# Patient Record
Sex: FEMALE | Race: WHITE | NOT HISPANIC OR LATINO
[De-identification: names, ages, dates, MRNs, and addresses within clinical notes are randomized per-mention and may not be internally consistent; named-entity substitution may affect disease eponyms.]

---

## 2020-08-04 PROBLEM — Z00.00 ENCOUNTER FOR PREVENTIVE HEALTH EXAMINATION: Status: ACTIVE | Noted: 2020-08-04

## 2020-08-05 ENCOUNTER — APPOINTMENT (OUTPATIENT)
Dept: OBGYN | Facility: CLINIC | Age: 35
End: 2020-08-05

## 2020-08-11 ENCOUNTER — APPOINTMENT (OUTPATIENT)
Dept: OBGYN | Facility: CLINIC | Age: 35
End: 2020-08-11
Payer: COMMERCIAL

## 2020-08-11 DIAGNOSIS — Z80.8 FAMILY HISTORY OF MALIGNANT NEOPLASM OF OTHER ORGANS OR SYSTEMS: ICD-10-CM

## 2020-08-11 DIAGNOSIS — H52.10 UNSPECIFIED ASTIGMATISM, UNSPECIFIED EYE: ICD-10-CM

## 2020-08-11 DIAGNOSIS — N91.2 AMENORRHEA, UNSPECIFIED: ICD-10-CM

## 2020-08-11 DIAGNOSIS — H52.209 UNSPECIFIED ASTIGMATISM, UNSPECIFIED EYE: ICD-10-CM

## 2020-08-11 DIAGNOSIS — Z86.59 PERSONAL HISTORY OF OTHER MENTAL AND BEHAVIORAL DISORDERS: ICD-10-CM

## 2020-08-11 DIAGNOSIS — Z80.49 FAMILY HISTORY OF MALIGNANT NEOPLASM OF OTHER GENITAL ORGANS: ICD-10-CM

## 2020-08-11 DIAGNOSIS — Z87.81 PERSONAL HISTORY OF (HEALED) TRAUMATIC FRACTURE: ICD-10-CM

## 2020-08-11 PROCEDURE — 99214 OFFICE O/P EST MOD 30 MIN: CPT | Mod: 95

## 2020-08-11 NOTE — HISTORY OF PRESENT ILLNESS
[unknown] : the patient is unsure of the date of her LMP [Regular Cycle Intervals] : periods have been irregular [On BCP at conception] : the patient was not on BCP at conception

## 2020-08-20 ENCOUNTER — APPOINTMENT (OUTPATIENT)
Dept: OBGYN | Facility: CLINIC | Age: 35
End: 2020-08-20
Payer: COMMERCIAL

## 2020-08-20 VITALS
DIASTOLIC BLOOD PRESSURE: 68 MMHG | SYSTOLIC BLOOD PRESSURE: 110 MMHG | BODY MASS INDEX: 27.56 KG/M2 | HEIGHT: 61 IN | WEIGHT: 146 LBS

## 2020-08-20 DIAGNOSIS — E55.9 VITAMIN D DEFICIENCY, UNSPECIFIED: ICD-10-CM

## 2020-08-20 DIAGNOSIS — Z11.59 ENCOUNTER FOR SCREENING FOR OTHER VIRAL DISEASES: ICD-10-CM

## 2020-08-20 DIAGNOSIS — Z32.00 ENCOUNTER FOR PREGNANCY TEST, RESULT UNKNOWN: ICD-10-CM

## 2020-08-20 PROCEDURE — 99214 OFFICE O/P EST MOD 30 MIN: CPT

## 2020-08-20 PROCEDURE — 76830 TRANSVAGINAL US NON-OB: CPT

## 2020-08-20 PROCEDURE — 36415 COLL VENOUS BLD VENIPUNCTURE: CPT

## 2020-08-21 ENCOUNTER — TRANSCRIPTION ENCOUNTER (OUTPATIENT)
Age: 35
End: 2020-08-21

## 2020-08-21 LAB
25(OH)D3 SERPL-MCNC: 34.5 NG/ML
ABO + RH PNL BLD: NORMAL
ALBUMIN SERPL ELPH-MCNC: 4.4 G/DL
ALP BLD-CCNC: 52 U/L
ALT SERPL-CCNC: 18 U/L
ANION GAP SERPL CALC-SCNC: 16 MMOL/L
AST SERPL-CCNC: 16 U/L
BASOPHILS # BLD AUTO: 0.06 K/UL
BASOPHILS NFR BLD AUTO: 0.5 %
BILIRUB SERPL-MCNC: 0.2 MG/DL
BLD GP AB SCN SERPL QL: NORMAL
BUN SERPL-MCNC: 13 MG/DL
CALCIUM SERPL-MCNC: 9.4 MG/DL
CHLORIDE SERPL-SCNC: 101 MMOL/L
CO2 SERPL-SCNC: 20 MMOL/L
CREAT SERPL-MCNC: 0.46 MG/DL
EOSINOPHIL # BLD AUTO: 0.12 K/UL
EOSINOPHIL NFR BLD AUTO: 0.9 %
FERRITIN SERPL-MCNC: 38 NG/ML
GLUCOSE SERPL-MCNC: 76 MG/DL
HBV SURFACE AG SER QL: NONREACTIVE
HCT VFR BLD CALC: 42.8 %
HGB BLD-MCNC: 13.9 G/DL
HIV1+2 AB SPEC QL IA.RAPID: NONREACTIVE
IMM GRANULOCYTES NFR BLD AUTO: 0.3 %
LYMPHOCYTES # BLD AUTO: 2.62 K/UL
LYMPHOCYTES NFR BLD AUTO: 20.1 %
MAN DIFF?: NORMAL
MCHC RBC-ENTMCNC: 32.1 PG
MCHC RBC-ENTMCNC: 32.5 GM/DL
MCV RBC AUTO: 98.8 FL
MEV IGG FLD QL IA: 162 AU/ML
MEV IGG+IGM SER-IMP: POSITIVE
MONOCYTES # BLD AUTO: 0.74 K/UL
MONOCYTES NFR BLD AUTO: 5.7 %
MUV AB SER-ACNC: POSITIVE
MUV IGG SER QL IA: 33.3 AU/ML
NEUTROPHILS # BLD AUTO: 9.47 K/UL
NEUTROPHILS NFR BLD AUTO: 72.5 %
PLATELET # BLD AUTO: 422 K/UL
POTASSIUM SERPL-SCNC: 4.5 MMOL/L
PROT SERPL-MCNC: 6.7 G/DL
RBC # BLD: 4.33 M/UL
RBC # FLD: 12.9 %
RUBV IGG FLD-ACNC: 3.6 INDEX
RUBV IGG SER-IMP: POSITIVE
SARS-COV-2 IGG SERPL IA-ACNC: 0.09 INDEX
SARS-COV-2 IGG SERPL QL IA: NEGATIVE
SODIUM SERPL-SCNC: 137 MMOL/L
T PALLIDUM AB SER QL IA: NEGATIVE
VZV AB TITR SER: POSITIVE
VZV IGG SER IF-ACNC: 657.2 INDEX
WBC # FLD AUTO: 13.05 K/UL

## 2020-08-24 LAB
BACTERIA UR CULT: NORMAL
HGB A MFR BLD: 97.6 %
HGB A2 MFR BLD: 2.4 %
HGB FRACT BLD-IMP: NORMAL
LEAD BLD-MCNC: <1 UG/DL

## 2020-08-25 ENCOUNTER — TRANSCRIPTION ENCOUNTER (OUTPATIENT)
Age: 35
End: 2020-08-25

## 2020-08-26 LAB
B19V IGG SER QL IA: 4.4 INDEX
B19V IGG+IGM SER-IMP: NORMAL
B19V IGG+IGM SER-IMP: POSITIVE
B19V IGM FLD-ACNC: 0.2 INDEX
B19V IGM SER-ACNC: NEGATIVE

## 2020-08-31 ENCOUNTER — APPOINTMENT (OUTPATIENT)
Dept: OBGYN | Facility: CLINIC | Age: 35
End: 2020-08-31
Payer: COMMERCIAL

## 2020-08-31 ENCOUNTER — NON-APPOINTMENT (OUTPATIENT)
Age: 35
End: 2020-08-31

## 2020-08-31 PROCEDURE — 0500F INITIAL PRENATAL CARE VISIT: CPT

## 2020-08-31 RX ORDER — DIPHENHYDRAMINE HCL 50 MG
25 CAPSULE ORAL
Qty: 30 | Refills: 2 | Status: DISCONTINUED | COMMUNITY
Start: 2020-08-20 | End: 2020-08-31

## 2020-08-31 RX ORDER — DOXYCYCLINE 100 MG/1
100 TABLET, FILM COATED ORAL
Qty: 2 | Refills: 0 | Status: DISCONTINUED | COMMUNITY
Start: 2020-05-17

## 2020-09-10 ENCOUNTER — LABORATORY RESULT (OUTPATIENT)
Age: 35
End: 2020-09-10

## 2020-09-10 ENCOUNTER — APPOINTMENT (OUTPATIENT)
Dept: OBGYN | Facility: CLINIC | Age: 35
End: 2020-09-10
Payer: COMMERCIAL

## 2020-09-10 DIAGNOSIS — O21.9 VOMITING OF PREGNANCY, UNSPECIFIED: ICD-10-CM

## 2020-09-10 PROCEDURE — 99212 OFFICE O/P EST SF 10 MIN: CPT

## 2020-09-10 PROCEDURE — 36415 COLL VENOUS BLD VENIPUNCTURE: CPT

## 2020-09-13 LAB
H PYLORI AB SER-ACNC: <5 UNITS
H PYLORI IGA SER-ACNC: <5 UNITS
T3RU NFR SERPL: 1.3 TBI
T4 FREE SERPL-MCNC: 1.3 NG/DL
T4 SERPL-MCNC: 11.2 UG/DL
TSH SERPL-ACNC: 1.26 UIU/ML
VIT B12 SERPL-MCNC: 623 PG/ML

## 2020-09-15 ENCOUNTER — NON-APPOINTMENT (OUTPATIENT)
Age: 35
End: 2020-09-15

## 2020-09-21 ENCOUNTER — TRANSCRIPTION ENCOUNTER (OUTPATIENT)
Age: 35
End: 2020-09-21

## 2020-09-25 ENCOUNTER — TRANSCRIPTION ENCOUNTER (OUTPATIENT)
Age: 35
End: 2020-09-25

## 2020-10-03 ENCOUNTER — TRANSCRIPTION ENCOUNTER (OUTPATIENT)
Age: 35
End: 2020-10-03

## 2020-10-03 ENCOUNTER — RX RENEWAL (OUTPATIENT)
Age: 35
End: 2020-10-03

## 2020-10-08 ENCOUNTER — NON-APPOINTMENT (OUTPATIENT)
Age: 35
End: 2020-10-08

## 2020-10-08 ENCOUNTER — APPOINTMENT (OUTPATIENT)
Dept: OBGYN | Facility: CLINIC | Age: 35
End: 2020-10-08
Payer: COMMERCIAL

## 2020-10-08 VITALS
DIASTOLIC BLOOD PRESSURE: 62 MMHG | WEIGHT: 156 LBS | BODY MASS INDEX: 29.45 KG/M2 | HEIGHT: 61 IN | SYSTOLIC BLOOD PRESSURE: 120 MMHG

## 2020-10-08 DIAGNOSIS — Z23 ENCOUNTER FOR IMMUNIZATION: ICD-10-CM

## 2020-10-08 PROCEDURE — 81003 URINALYSIS AUTO W/O SCOPE: CPT | Mod: NC,QW

## 2020-10-08 PROCEDURE — G0008: CPT

## 2020-10-08 PROCEDURE — 0502F SUBSEQUENT PRENATAL CARE: CPT

## 2020-10-08 PROCEDURE — 36415 COLL VENOUS BLD VENIPUNCTURE: CPT

## 2020-10-08 PROCEDURE — 90686 IIV4 VACC NO PRSV 0.5 ML IM: CPT

## 2020-10-08 RX ORDER — ONDANSETRON 4 MG/1
4 TABLET, ORALLY DISINTEGRATING ORAL EVERY 8 HOURS
Qty: 42 | Refills: 2 | Status: DISCONTINUED | COMMUNITY
Start: 2020-08-31 | End: 2020-10-08

## 2020-10-09 LAB
BASOPHILS # BLD AUTO: 0.04 K/UL
BASOPHILS NFR BLD AUTO: 0.3 %
EOSINOPHIL # BLD AUTO: 0.11 K/UL
EOSINOPHIL NFR BLD AUTO: 0.9 %
HCT VFR BLD CALC: 41.5 %
HGB BLD-MCNC: 13.4 G/DL
IMM GRANULOCYTES NFR BLD AUTO: 0.4 %
LYMPHOCYTES # BLD AUTO: 2.19 K/UL
LYMPHOCYTES NFR BLD AUTO: 17.1 %
MAN DIFF?: NORMAL
MCHC RBC-ENTMCNC: 32.3 GM/DL
MCHC RBC-ENTMCNC: 32.4 PG
MCV RBC AUTO: 100.2 FL
MONOCYTES # BLD AUTO: 0.61 K/UL
MONOCYTES NFR BLD AUTO: 4.8 %
NEUTROPHILS # BLD AUTO: 9.8 K/UL
NEUTROPHILS NFR BLD AUTO: 76.5 %
PLATELET # BLD AUTO: 378 K/UL
RBC # BLD: 4.14 M/UL
RBC # FLD: 13.5 %
WBC # FLD AUTO: 12.8 K/UL

## 2020-10-13 LAB
2ND TRIMESTER DATA: NORMAL
AFP PNL SERPL: NORMAL
AFP SERPL-ACNC: NORMAL
C TRACH RRNA SPEC QL NAA+PROBE: NOT DETECTED
CLINICAL BIOCHEMIST REVIEW: NORMAL
N GONORRHOEA RRNA SPEC QL NAA+PROBE: NOT DETECTED
NOTES NTD: NORMAL
SOURCE AMPLIFICATION: NORMAL

## 2020-10-26 ENCOUNTER — RX RENEWAL (OUTPATIENT)
Age: 35
End: 2020-10-26

## 2020-10-26 ENCOUNTER — TRANSCRIPTION ENCOUNTER (OUTPATIENT)
Age: 35
End: 2020-10-26

## 2020-10-30 ENCOUNTER — APPOINTMENT (OUTPATIENT)
Dept: OBGYN | Facility: CLINIC | Age: 35
End: 2020-10-30
Payer: COMMERCIAL

## 2020-10-30 VITALS — WEIGHT: 164 LBS | HEIGHT: 61 IN | TEMPERATURE: 99.4 F | BODY MASS INDEX: 30.96 KG/M2

## 2020-10-30 DIAGNOSIS — Z71.9 COUNSELING, UNSPECIFIED: ICD-10-CM

## 2020-10-30 DIAGNOSIS — Z98.891 HISTORY OF UTERINE SCAR FROM PREVIOUS SURGERY: ICD-10-CM

## 2020-10-30 PROCEDURE — 99072 ADDL SUPL MATRL&STAF TM PHE: CPT

## 2020-10-30 PROCEDURE — 99213 OFFICE O/P EST LOW 20 MIN: CPT

## 2020-10-30 NOTE — PLAN
[FreeTextEntry1] : Pt counseled about r/b/a of  TOLAC d/w pt and she was given time to ask questions.  We discussed risk of uterine rupture around 1%, subsequent risk of fetal injury in the case of rupture. All questions answered.  She is cleared for vaginal delivery.

## 2020-10-30 NOTE — HISTORY OF PRESENT ILLNESS
[FreeTextEntry1] : 36yo P1 @ 20wks , seeing [facility] [place] here for  consult.\par Previous delivery at [place]: 2018- IOL for oligo at 41+ wks- got to FD and pushed for 3-4 hours but baby was asynclitic.  She was at a midwife practice in the city.\par \par Pt has provided her op report which shows LFT incision.2 layer closure\par \[place] Works in public health nursing.  Used to work at [employer] until this year.

## 2020-11-05 ENCOUNTER — APPOINTMENT (OUTPATIENT)
Dept: OBGYN | Facility: CLINIC | Age: 35
End: 2020-11-05
Payer: COMMERCIAL

## 2020-11-05 ENCOUNTER — NON-APPOINTMENT (OUTPATIENT)
Age: 35
End: 2020-11-05

## 2020-11-05 VITALS
DIASTOLIC BLOOD PRESSURE: 60 MMHG | BODY MASS INDEX: 31.34 KG/M2 | WEIGHT: 166 LBS | SYSTOLIC BLOOD PRESSURE: 116 MMHG | HEIGHT: 61 IN

## 2020-11-05 PROCEDURE — 0502F SUBSEQUENT PRENATAL CARE: CPT

## 2020-11-12 ENCOUNTER — TRANSCRIPTION ENCOUNTER (OUTPATIENT)
Age: 35
End: 2020-11-12

## 2020-12-01 ENCOUNTER — NON-APPOINTMENT (OUTPATIENT)
Age: 35
End: 2020-12-01

## 2020-12-01 ENCOUNTER — APPOINTMENT (OUTPATIENT)
Dept: OBGYN | Facility: CLINIC | Age: 35
End: 2020-12-01
Payer: COMMERCIAL

## 2020-12-01 PROCEDURE — 0502F SUBSEQUENT PRENATAL CARE: CPT

## 2020-12-18 ENCOUNTER — RX RENEWAL (OUTPATIENT)
Age: 35
End: 2020-12-18

## 2020-12-31 ENCOUNTER — APPOINTMENT (OUTPATIENT)
Dept: OBGYN | Facility: CLINIC | Age: 35
End: 2020-12-31
Payer: COMMERCIAL

## 2020-12-31 ENCOUNTER — NON-APPOINTMENT (OUTPATIENT)
Age: 35
End: 2020-12-31

## 2020-12-31 ENCOUNTER — MED ADMIN CHARGE (OUTPATIENT)
Age: 35
End: 2020-12-31

## 2020-12-31 VITALS
WEIGHT: 182 LBS | DIASTOLIC BLOOD PRESSURE: 62 MMHG | BODY MASS INDEX: 34.36 KG/M2 | SYSTOLIC BLOOD PRESSURE: 112 MMHG | HEIGHT: 61 IN

## 2020-12-31 PROCEDURE — 99072 ADDL SUPL MATRL&STAF TM PHE: CPT

## 2020-12-31 PROCEDURE — 90715 TDAP VACCINE 7 YRS/> IM: CPT

## 2020-12-31 PROCEDURE — 90471 IMMUNIZATION ADMIN: CPT

## 2020-12-31 PROCEDURE — 81002 URINALYSIS NONAUTO W/O SCOPE: CPT

## 2020-12-31 PROCEDURE — 36415 COLL VENOUS BLD VENIPUNCTURE: CPT

## 2020-12-31 PROCEDURE — 0502F SUBSEQUENT PRENATAL CARE: CPT

## 2021-01-05 ENCOUNTER — TRANSCRIPTION ENCOUNTER (OUTPATIENT)
Age: 36
End: 2021-01-05

## 2021-01-05 LAB
BASOPHILS # BLD AUTO: 0.05 K/UL
BASOPHILS NFR BLD AUTO: 0.4 %
EOSINOPHIL # BLD AUTO: 0.14 K/UL
EOSINOPHIL NFR BLD AUTO: 1 %
GLUCOSE 1H P 50 G GLC PO SERPL-MCNC: 105 MG/DL
HCT VFR BLD CALC: 39.5 %
HGB BLD-MCNC: 13.1 G/DL
IMM GRANULOCYTES NFR BLD AUTO: 1.5 %
LYMPHOCYTES # BLD AUTO: 2.42 K/UL
LYMPHOCYTES NFR BLD AUTO: 17.8 %
MAN DIFF?: NORMAL
MCHC RBC-ENTMCNC: 33.2 GM/DL
MCHC RBC-ENTMCNC: 33.9 PG
MCV RBC AUTO: 102.1 FL
MONOCYTES # BLD AUTO: 0.81 K/UL
MONOCYTES NFR BLD AUTO: 6 %
NEUTROPHILS # BLD AUTO: 9.96 K/UL
NEUTROPHILS NFR BLD AUTO: 73.3 %
PLATELET # BLD AUTO: 305 K/UL
RBC # BLD: 3.87 M/UL
RBC # FLD: 13.2 %
WBC # FLD AUTO: 13.58 K/UL

## 2021-01-11 ENCOUNTER — APPOINTMENT (OUTPATIENT)
Dept: OBGYN | Facility: CLINIC | Age: 36
End: 2021-01-11
Payer: COMMERCIAL

## 2021-01-11 ENCOUNTER — NON-APPOINTMENT (OUTPATIENT)
Age: 36
End: 2021-01-11

## 2021-01-11 PROCEDURE — 0502F SUBSEQUENT PRENATAL CARE: CPT

## 2021-01-27 ENCOUNTER — APPOINTMENT (OUTPATIENT)
Dept: OBGYN | Facility: CLINIC | Age: 36
End: 2021-01-27
Payer: COMMERCIAL

## 2021-01-27 PROCEDURE — 0502F SUBSEQUENT PRENATAL CARE: CPT

## 2021-01-28 ENCOUNTER — NON-APPOINTMENT (OUTPATIENT)
Age: 36
End: 2021-01-28

## 2021-02-08 ENCOUNTER — APPOINTMENT (OUTPATIENT)
Dept: OBGYN | Facility: CLINIC | Age: 36
End: 2021-02-08
Payer: COMMERCIAL

## 2021-02-08 ENCOUNTER — NON-APPOINTMENT (OUTPATIENT)
Age: 36
End: 2021-02-08

## 2021-02-08 PROCEDURE — 0502F SUBSEQUENT PRENATAL CARE: CPT

## 2021-02-19 ENCOUNTER — NON-APPOINTMENT (OUTPATIENT)
Age: 36
End: 2021-02-19

## 2021-02-19 ENCOUNTER — APPOINTMENT (OUTPATIENT)
Dept: OBGYN | Facility: CLINIC | Age: 36
End: 2021-02-19
Payer: COMMERCIAL

## 2021-02-19 VITALS
SYSTOLIC BLOOD PRESSURE: 120 MMHG | WEIGHT: 186 LBS | DIASTOLIC BLOOD PRESSURE: 80 MMHG | TEMPERATURE: 97.8 F | HEIGHT: 61 IN | BODY MASS INDEX: 35.12 KG/M2

## 2021-02-19 PROCEDURE — 0502F SUBSEQUENT PRENATAL CARE: CPT

## 2021-02-24 ENCOUNTER — NON-APPOINTMENT (OUTPATIENT)
Age: 36
End: 2021-02-24

## 2021-02-24 ENCOUNTER — APPOINTMENT (OUTPATIENT)
Dept: OBGYN | Facility: CLINIC | Age: 36
End: 2021-02-24
Payer: COMMERCIAL

## 2021-02-24 VITALS
BODY MASS INDEX: 36.06 KG/M2 | WEIGHT: 191 LBS | DIASTOLIC BLOOD PRESSURE: 70 MMHG | HEIGHT: 61 IN | SYSTOLIC BLOOD PRESSURE: 120 MMHG

## 2021-02-24 PROCEDURE — 36415 COLL VENOUS BLD VENIPUNCTURE: CPT

## 2021-02-24 PROCEDURE — 0502F SUBSEQUENT PRENATAL CARE: CPT

## 2021-02-24 RX ORDER — PNV/FERROUS SULFATE/FOLIC ACID 27-<0.5MG
TABLET ORAL
Refills: 0 | Status: ACTIVE | COMMUNITY

## 2021-02-26 LAB
BASOPHILS # BLD AUTO: 0.07 K/UL
BASOPHILS NFR BLD AUTO: 0.4 %
C TRACH RRNA SPEC QL NAA+PROBE: NOT DETECTED
EOSINOPHIL # BLD AUTO: 0.15 K/UL
EOSINOPHIL NFR BLD AUTO: 0.9 %
FERRITIN SERPL-MCNC: 41 NG/ML
HCT VFR BLD CALC: 44.6 %
HGB BLD-MCNC: 14.6 G/DL
HIV1+2 AB SPEC QL IA.RAPID: NONREACTIVE
IMM GRANULOCYTES NFR BLD AUTO: 2.1 %
LYMPHOCYTES # BLD AUTO: 2.42 K/UL
LYMPHOCYTES NFR BLD AUTO: 15.1 %
MAN DIFF?: NORMAL
MCHC RBC-ENTMCNC: 32.7 GM/DL
MCHC RBC-ENTMCNC: 33.6 PG
MCV RBC AUTO: 102.5 FL
MONOCYTES # BLD AUTO: 1.16 K/UL
MONOCYTES NFR BLD AUTO: 7.2 %
N GONORRHOEA RRNA SPEC QL NAA+PROBE: NOT DETECTED
NEUTROPHILS # BLD AUTO: 11.91 K/UL
NEUTROPHILS NFR BLD AUTO: 74.3 %
PLATELET # BLD AUTO: 260 K/UL
RBC # BLD: 4.35 M/UL
RBC # FLD: 13.3 %
SARS-COV-2 IGG SERPL IA-ACNC: 0.07 INDEX
SARS-COV-2 IGG SERPL QL IA: NEGATIVE
SOURCE AMPLIFICATION: NORMAL
T PALLIDUM AB SER QL IA: NEGATIVE
WBC # FLD AUTO: 16.04 K/UL

## 2021-03-02 ENCOUNTER — NON-APPOINTMENT (OUTPATIENT)
Age: 36
End: 2021-03-02

## 2021-03-02 LAB — B-HEM STREP SPEC QL CULT: NORMAL

## 2021-03-04 ENCOUNTER — NON-APPOINTMENT (OUTPATIENT)
Age: 36
End: 2021-03-04

## 2021-03-04 ENCOUNTER — APPOINTMENT (OUTPATIENT)
Dept: OBGYN | Facility: CLINIC | Age: 36
End: 2021-03-04
Payer: COMMERCIAL

## 2021-03-04 VITALS
WEIGHT: 192.02 LBS | HEIGHT: 61 IN | SYSTOLIC BLOOD PRESSURE: 130 MMHG | DIASTOLIC BLOOD PRESSURE: 62 MMHG | BODY MASS INDEX: 36.25 KG/M2

## 2021-03-04 PROCEDURE — 0502F SUBSEQUENT PRENATAL CARE: CPT

## 2021-03-11 ENCOUNTER — APPOINTMENT (OUTPATIENT)
Dept: OBGYN | Facility: CLINIC | Age: 36
End: 2021-03-11
Payer: COMMERCIAL

## 2021-03-11 ENCOUNTER — NON-APPOINTMENT (OUTPATIENT)
Age: 36
End: 2021-03-11

## 2021-03-11 VITALS
HEIGHT: 61 IN | WEIGHT: 194.03 LBS | BODY MASS INDEX: 36.63 KG/M2 | DIASTOLIC BLOOD PRESSURE: 62 MMHG | SYSTOLIC BLOOD PRESSURE: 116 MMHG

## 2021-03-11 PROCEDURE — 0502F SUBSEQUENT PRENATAL CARE: CPT

## 2021-03-16 ENCOUNTER — NON-APPOINTMENT (OUTPATIENT)
Age: 36
End: 2021-03-16

## 2021-03-17 ENCOUNTER — NON-APPOINTMENT (OUTPATIENT)
Age: 36
End: 2021-03-17

## 2021-03-18 ENCOUNTER — APPOINTMENT (OUTPATIENT)
Dept: OBGYN | Facility: CLINIC | Age: 36
End: 2021-03-18

## 2021-03-31 ENCOUNTER — APPOINTMENT (OUTPATIENT)
Dept: OBGYN | Facility: CLINIC | Age: 36
End: 2021-03-31
Payer: COMMERCIAL

## 2021-03-31 PROCEDURE — 99443: CPT

## 2021-05-04 ENCOUNTER — APPOINTMENT (OUTPATIENT)
Dept: OBGYN | Facility: CLINIC | Age: 36
End: 2021-05-04
Payer: COMMERCIAL

## 2021-05-04 VITALS — HEIGHT: 61 IN | DIASTOLIC BLOOD PRESSURE: 76 MMHG | SYSTOLIC BLOOD PRESSURE: 112 MMHG

## 2021-05-04 DIAGNOSIS — Z34.83 ENCOUNTER FOR SUPERVISION OF OTHER NORMAL PREGNANCY, THIRD TRIMESTER: ICD-10-CM

## 2021-05-04 DIAGNOSIS — Z34.82 ENCOUNTER FOR SUPERVISION OF OTHER NORMAL PREGNANCY, SECOND TRIMESTER: ICD-10-CM

## 2021-05-04 DIAGNOSIS — Z34.81 ENCOUNTER FOR SUPERVISION OF OTHER NORMAL PREGNANCY, FIRST TRIMESTER: ICD-10-CM

## 2021-05-04 PROCEDURE — 0503F POSTPARTUM CARE VISIT: CPT

## 2021-05-04 RX ORDER — DOXYLAMINE SUCCINATE AND PYRIDOXINE HYDROCHLORIDE 10; 10 MG/1; MG/1
10-10 TABLET, DELAYED RELEASE ORAL
Qty: 30 | Refills: 0 | Status: DISCONTINUED | COMMUNITY
Start: 2020-08-20 | End: 2021-05-04

## 2021-05-04 RX ORDER — DOXYLAMINE SUCCINATE AND PYRIDOXINE HYDROCHLORIDE 10; 10 MG/1; MG/1
10-10 TABLET, DELAYED RELEASE ORAL
Qty: 120 | Refills: 2 | Status: DISCONTINUED | COMMUNITY
Start: 2020-12-31 | End: 2021-05-04

## 2021-05-06 ENCOUNTER — TRANSCRIPTION ENCOUNTER (OUTPATIENT)
Age: 36
End: 2021-05-06

## 2021-05-10 ENCOUNTER — TRANSCRIPTION ENCOUNTER (OUTPATIENT)
Age: 36
End: 2021-05-10

## 2021-05-10 DIAGNOSIS — N61.0 MASTITIS WITHOUT ABSCESS: ICD-10-CM

## 2021-05-10 RX ORDER — DICLOXACILLIN SODIUM 500 MG/1
500 CAPSULE ORAL 4 TIMES DAILY
Qty: 40 | Refills: 0 | Status: ACTIVE | COMMUNITY
Start: 2021-05-10 | End: 1900-01-01

## 2021-06-18 ENCOUNTER — APPOINTMENT (OUTPATIENT)
Dept: OBGYN | Facility: CLINIC | Age: 36
End: 2021-06-18
Payer: COMMERCIAL

## 2021-06-18 VITALS — HEIGHT: 61 IN | DIASTOLIC BLOOD PRESSURE: 80 MMHG | SYSTOLIC BLOOD PRESSURE: 112 MMHG

## 2021-06-18 DIAGNOSIS — I73.9 PERIPHERAL VASCULAR DISEASE, UNSPECIFIED: ICD-10-CM

## 2021-06-18 DIAGNOSIS — N64.4 MASTODYNIA: ICD-10-CM

## 2021-06-18 PROCEDURE — 99072 ADDL SUPL MATRL&STAF TM PHE: CPT

## 2021-06-18 PROCEDURE — 99212 OFFICE O/P EST SF 10 MIN: CPT

## 2021-06-18 RX ORDER — NIFEDIPINE 30 MG/1
30 TABLET, EXTENDED RELEASE ORAL DAILY
Qty: 14 | Refills: 1 | Status: ACTIVE | COMMUNITY
Start: 2021-06-18 | End: 1900-01-01

## 2021-06-18 RX ORDER — FLUCONAZOLE 150 MG/1
150 TABLET ORAL
Qty: 1 | Refills: 1 | Status: ACTIVE | COMMUNITY
Start: 2021-06-18 | End: 1900-01-01